# Patient Record
Sex: FEMALE | Race: WHITE | NOT HISPANIC OR LATINO | Employment: FULL TIME | URBAN - METROPOLITAN AREA
[De-identification: names, ages, dates, MRNs, and addresses within clinical notes are randomized per-mention and may not be internally consistent; named-entity substitution may affect disease eponyms.]

---

## 2017-02-15 ENCOUNTER — TRANSCRIBE ORDERS (OUTPATIENT)
Dept: MAMMOGRAPHY | Facility: HOSPITAL | Age: 52
End: 2017-02-15

## 2017-02-15 ENCOUNTER — TRANSCRIBE ORDERS (OUTPATIENT)
Dept: GYNECOLOGIC ONCOLOGY | Facility: CLINIC | Age: 52
End: 2017-02-15

## 2017-02-15 DIAGNOSIS — Z12.31 VISIT FOR SCREENING MAMMOGRAM: Primary | ICD-10-CM

## 2017-03-10 ENCOUNTER — HOSPITAL ENCOUNTER (OUTPATIENT)
Dept: MAMMOGRAPHY | Facility: HOSPITAL | Age: 52
Discharge: HOME | End: 2017-03-10
Admitting: NURSE PRACTITIONER

## 2017-03-10 DIAGNOSIS — Z12.31 VISIT FOR SCREENING MAMMOGRAM: ICD-10-CM

## 2017-03-10 PROCEDURE — G0202 SCR MAMMO BI INCL CAD: HCPCS

## 2017-03-10 PROCEDURE — 77063 BREAST TOMOSYNTHESIS BI: CPT | Performed by: RADIOLOGY

## 2017-03-10 PROCEDURE — 77063 BREAST TOMOSYNTHESIS BI: CPT

## 2017-03-10 PROCEDURE — 77067 SCR MAMMO BI INCL CAD: CPT | Performed by: RADIOLOGY

## 2017-03-29 ENCOUNTER — HOSPITAL ENCOUNTER (OUTPATIENT)
Dept: MAMMOGRAPHY | Facility: HOSPITAL | Age: 52
Discharge: HOME | End: 2017-03-29
Admitting: NURSE PRACTITIONER

## 2017-03-29 DIAGNOSIS — R92.8 ABNORMAL MAMMOGRAM: ICD-10-CM

## 2017-03-29 PROCEDURE — G0206 DX MAMMO INCL CAD UNI: HCPCS

## 2017-03-29 PROCEDURE — 77065 DX MAMMO INCL CAD UNI: CPT | Performed by: RADIOLOGY

## 2017-03-29 PROCEDURE — 77061 BREAST TOMOSYNTHESIS UNI: CPT | Performed by: RADIOLOGY

## 2017-03-29 PROCEDURE — G0279 TOMOSYNTHESIS, MAMMO: HCPCS

## 2018-03-15 ENCOUNTER — TRANSCRIBE ORDERS (OUTPATIENT)
Dept: ADMINISTRATIVE | Facility: HOSPITAL | Age: 53
End: 2018-03-15

## 2018-03-15 DIAGNOSIS — Z12.31 VISIT FOR SCREENING MAMMOGRAM: Primary | ICD-10-CM

## 2019-02-05 ENCOUNTER — HOSPITAL ENCOUNTER (OUTPATIENT)
Dept: MAMMOGRAPHY | Facility: HOSPITAL | Age: 54
Discharge: HOME | End: 2019-02-05
Attending: NURSE PRACTITIONER | Admitting: NURSE PRACTITIONER

## 2019-02-05 DIAGNOSIS — Z12.31 VISIT FOR SCREENING MAMMOGRAM: ICD-10-CM

## 2019-02-05 PROCEDURE — 77067 SCR MAMMO BI INCL CAD: CPT | Performed by: RADIOLOGY

## 2019-02-05 PROCEDURE — 77067 SCR MAMMO BI INCL CAD: CPT

## 2019-02-05 PROCEDURE — 77063 BREAST TOMOSYNTHESIS BI: CPT

## 2019-02-05 PROCEDURE — 77063 BREAST TOMOSYNTHESIS BI: CPT | Performed by: RADIOLOGY

## 2020-02-26 ENCOUNTER — TRANSCRIBE ORDERS (OUTPATIENT)
Dept: ADMINISTRATIVE | Facility: HOSPITAL | Age: 55
End: 2020-02-26

## 2020-02-26 DIAGNOSIS — Z12.31 VISIT FOR SCREENING MAMMOGRAM: Primary | ICD-10-CM

## 2020-02-28 ENCOUNTER — HOSPITAL ENCOUNTER (OUTPATIENT)
Dept: MAMMOGRAPHY | Facility: HOSPITAL | Age: 55
Discharge: HOME | End: 2020-02-28
Admitting: NURSE PRACTITIONER

## 2020-02-28 DIAGNOSIS — Z12.31 VISIT FOR SCREENING MAMMOGRAM: ICD-10-CM

## 2020-02-28 PROCEDURE — 77063 BREAST TOMOSYNTHESIS BI: CPT

## 2020-02-28 PROCEDURE — 77067 SCR MAMMO BI INCL CAD: CPT

## 2020-02-28 PROCEDURE — 77063 BREAST TOMOSYNTHESIS BI: CPT | Performed by: RADIOLOGY

## 2020-02-28 PROCEDURE — 77067 SCR MAMMO BI INCL CAD: CPT | Performed by: RADIOLOGY

## 2021-01-19 ENCOUNTER — TRANSCRIBE ORDERS (OUTPATIENT)
Dept: ADMINISTRATIVE | Facility: HOSPITAL | Age: 56
End: 2021-01-19

## 2021-01-19 DIAGNOSIS — Z12.31 VISIT FOR SCREENING MAMMOGRAM: Primary | ICD-10-CM

## 2021-03-20 ENCOUNTER — HOSPITAL ENCOUNTER (OUTPATIENT)
Dept: MAMMOGRAPHY | Facility: HOSPITAL | Age: 56
Discharge: HOME | End: 2021-03-20
Admitting: NURSE PRACTITIONER

## 2021-03-20 DIAGNOSIS — Z12.31 VISIT FOR SCREENING MAMMOGRAM: ICD-10-CM

## 2021-03-20 PROCEDURE — 77067 SCR MAMMO BI INCL CAD: CPT

## 2021-03-20 PROCEDURE — 77067 SCR MAMMO BI INCL CAD: CPT | Performed by: RADIOLOGY

## 2021-03-20 PROCEDURE — 77063 BREAST TOMOSYNTHESIS BI: CPT

## 2021-03-20 PROCEDURE — 77063 BREAST TOMOSYNTHESIS BI: CPT | Performed by: RADIOLOGY

## 2022-04-19 ENCOUNTER — TRANSCRIBE ORDERS (OUTPATIENT)
Dept: ADMINISTRATIVE | Facility: HOSPITAL | Age: 57
End: 2022-04-19

## 2022-04-19 DIAGNOSIS — Z12.31 VISIT FOR SCREENING MAMMOGRAM: Primary | ICD-10-CM

## 2022-05-05 ENCOUNTER — HOSPITAL ENCOUNTER (OUTPATIENT)
Dept: MAMMOGRAPHY | Facility: HOSPITAL | Age: 57
Discharge: HOME | End: 2022-05-05
Admitting: NURSE PRACTITIONER

## 2022-05-05 DIAGNOSIS — Z12.31 VISIT FOR SCREENING MAMMOGRAM: ICD-10-CM

## 2022-05-05 PROCEDURE — 77063 BREAST TOMOSYNTHESIS BI: CPT | Performed by: RADIOLOGY

## 2022-05-05 PROCEDURE — 77067 SCR MAMMO BI INCL CAD: CPT | Performed by: RADIOLOGY

## 2022-05-05 PROCEDURE — 77063 BREAST TOMOSYNTHESIS BI: CPT

## 2022-05-05 PROCEDURE — 77067 SCR MAMMO BI INCL CAD: CPT

## 2022-12-05 ENCOUNTER — OFFICE VISIT (OUTPATIENT)
Dept: ORTHOPEDIC SURGERY | Facility: CLINIC | Age: 57
End: 2022-12-05

## 2022-12-05 VITALS
HEIGHT: 63 IN | WEIGHT: 140 LBS | BODY MASS INDEX: 24.8 KG/M2 | DIASTOLIC BLOOD PRESSURE: 78 MMHG | SYSTOLIC BLOOD PRESSURE: 132 MMHG

## 2022-12-05 DIAGNOSIS — M79.672 BILATERAL FOOT PAIN: Primary | ICD-10-CM

## 2022-12-05 DIAGNOSIS — M72.2 PLANTAR FASCIITIS: ICD-10-CM

## 2022-12-05 DIAGNOSIS — M79.671 BILATERAL FOOT PAIN: Primary | ICD-10-CM

## 2022-12-05 DIAGNOSIS — I87.8 VENOUS STASIS: ICD-10-CM

## 2022-12-05 PROCEDURE — 99204 OFFICE O/P NEW MOD 45 MIN: CPT | Performed by: ORTHOPAEDIC SURGERY

## 2022-12-05 RX ORDER — ATORVASTATIN CALCIUM 20 MG/1
TABLET, FILM COATED ORAL
COMMUNITY

## 2022-12-05 RX ORDER — CETIRIZINE HYDROCHLORIDE 10 MG/1
CAPSULE, LIQUID FILLED ORAL
COMMUNITY

## 2022-12-05 RX ORDER — SULFAMETHOXAZOLE AND TRIMETHOPRIM 400; 80 MG/1; MG/1
TABLET ORAL
COMMUNITY

## 2022-12-05 RX ORDER — BUPROPION HYDROCHLORIDE 300 MG/1
TABLET ORAL
COMMUNITY
Start: 2022-09-26

## 2022-12-05 NOTE — PROGRESS NOTES
NEW PATIENT    Patient: Julissa Hendricks  : 1965    Primary Care Provider: Dong Bhat MD    Requesting Provider: As above    Pain of the Left Foot and Pain of the Right Foot      History    Chief Complaint: Bilateral foot pain    History of Present Illness: This is an extremely pleasant 57-year-old woman here with several foot problems.  She first started noting the problems about 6 weeks ago.  A little bit earlier in the summer she went on a long road trip type vacation in an  from [place] to the [place] and back to [place].  She does not wear compression socks.  After the trip she began to notice bilateral arch pain.  It feels very stiff and painful in the morning or after being in a seated position.  She also then began to have pain under the right forefoot.  The forefoot pain is worse walking in a shoe.  She does not go barefoot.  She has tried some stretching without much improvement.  She rates the pain in the 3 different areas as 3 out of 10.  She wants to make sure that she is not causing any damage.  She has been looking up problems on the Internet, and thinks she might have Planter fasciitis metatarsalgia      The patient does not have a personal or family history of DVT, PE, hypercoagulable state or risk factors for clotting.        Current Outpatient Medications on File Prior to Visit   Medication Sig Dispense Refill   • atorvastatin (LIPITOR) 20 MG tablet      • buPROPion XL (WELLBUTRIN XL) 300 MG 24 hr tablet      • Cetirizine HCl (ZyrTEC Allergy) 10 MG capsule      • sulfamethoxazole-trimethoprim (Bactrim) 400-80 MG tablet Bactrim       No current facility-administered medications on file prior to visit.      Allergies   Allergen Reactions   • Neosporin + Pain Relief Max St [Qymwh-Bmync-Yvkfreu-Pramoxine] Unknown - Low Severity   • Adhesive Tape Rash      History reviewed. No pertinent past medical history.  History reviewed. No pertinent surgical  "history.  Family History   Problem Relation Age of Onset   • Breast cancer Neg Hx    • Ovarian cancer Neg Hx       Social History     Socioeconomic History   • Marital status:         Review of Systems    The following portions of the patient's history were reviewed and updated as appropriate: allergies, current medications, past family history, past medical history, past social history, past surgical history and problem list.    Physical Exam:   /78   Ht 160 cm (62.99\")   Wt 63.5 kg (140 lb)   BMI 24.81 kg/m²   GENERAL: Body habitus: normal weight for height    Lower extremity edema: Right: trace; Left: trace    Varicose veins:  Right: mild; Left: mild    Gait: antalgic with the first few steps     Mental Status:  awake and alert; oriented to person, place, and time    Voice:  clear  SKIN:  Lower extremity: Normal    Hair Growth(lower extremity):  Right:normal; Left:  normal  NAILS: Toenails: normal  HEENT: Head: Normocephalic, atraumatic,  without obvious abnormality.  eye: normal external eye, no icterus  ears:normal external ears  PULM:  Repiratory effort normal  CV:  Dorsalis Pedis:  Right: 2+; Left:2+    Posterior Tibial: Right:2+; Left:2+    Capillary Refill:  Brisk  MSK:  Hand:sensation intact      Tibia:  Right:  non tender; Left:  non tender      Ankle:  Right: non tender; Left:  non tender      Foot:  Right:  Mildly tender along the plantar fascia and tender under the second metatarsal head, no swelling, normal range of motion; Left:  Mildly tender along the plantar fascial      NEURO: Heel Walking:  Right:  normal; Left:  normal    Toe Walking:  Right:  normal; Left:  normal     Ecorse-Nohemy 5.07 monofilament test: normal    Lower extremity sensation: intact        Calf Atrophy:none    Motor Function: all 5/5         Medical Decision Making    Data Review:   ordered and reviewed x-rays today    Assessment and Plan/ Diagnosis/Treatment options:   1. Bilateral foot pain  She has 3 " "different sources for foot pain.  The first is metatarsalgia on the right.  I showed her how to tape the toe or to use a Budin splint and I also gave her a metatarsal pad and explained how to put it in her shoe.  I also gave her prescription for custom orthotics.  I explained metatarsalgia.  I do not think she is developing hammertoe I think it is purely metatarsalgia.  - XR Foot 2 View Bilateral      I will be happy to see her anytime      2. Plantar fasciitis  Her second problem is Planter fasciitis.  Diagnosis: plantar fasciitis:  I explained plantar fasciitis in detail to the patient.  I explained to the bow-string mechanism of the plantar fascia.  I went over the current research of the [employer] with them.  I explained the treatments that were not statistically significant in improving the problem including: injection of steroids, special orthotics, special shoes, surgery, medication, ice, not working, etc.    I explained the treatments that are statistically significant at improving the problem.  These include stretching/night splinting, casting, PRP.    I explained the most important treatment: Stretching and night splinting.  I went over the patient information sheet with them, I showed them the stretches and they were able to reproduce them.  I emphasized the \"toe pull\" as the most important stretch.  They need to do the stretches 5 repetitions each, 6-8 times per day.  I explained how to do them at work, at home, before getting out of bed, before getting out of the car, and before getting up from a chair.    We provided them with a night splint.    If they do not improve after 4 months of aggressive stretching and night splinting, the next step will be a short leg fiberglass walking cast worn for 6 weeks.    · Preprinted education was provided to the patient.        .      3. Venous stasis  The third problem I think is some mild edema and venous stasis.  I explained that " even mild venous stasis can increase the stiffness that we feel an aching leg pain.  I explained edema and venous stasis to the patient, and the often hereditary nature of the problem, and the contribution of weight, trauma, age, etc. I explained how these factors cause edema (due to gravity, etc) I explained how the edema can lead to ulceration.  I explained how the edema can cause pain, stiffness, aching, cramping, etc. I explained that it is a very very common problem, so common that even [employer] markets compression stockings.  I explained that diuretics cannot correct the problem. I recommend wearing support stockings (compression stockings) daily, we discussed what type and where to find them                  Part of this encounter note is an electronic transcription/translation of spoken language to printed text. The electronic translation of spoken language may permit erroneous, or at times, nonsensical words or phrases to be inadvertently transcribed; Although I have reviewed the note for such errors, some may still exist.          Rachel Quan MD

## 2023-05-02 ENCOUNTER — TRANSCRIBE ORDERS (OUTPATIENT)
Dept: ADMINISTRATIVE | Facility: HOSPITAL | Age: 58
End: 2023-05-02
Payer: COMMERCIAL

## 2023-05-02 DIAGNOSIS — Z12.31 VISIT FOR SCREENING MAMMOGRAM: Primary | ICD-10-CM

## 2023-05-23 ENCOUNTER — HOSPITAL ENCOUNTER (OUTPATIENT)
Dept: MAMMOGRAPHY | Facility: HOSPITAL | Age: 58
Discharge: HOME | End: 2023-05-23
Admitting: FAMILY MEDICINE
Payer: COMMERCIAL

## 2023-05-23 DIAGNOSIS — Z12.31 VISIT FOR SCREENING MAMMOGRAM: ICD-10-CM

## 2023-05-23 PROCEDURE — 77063 BREAST TOMOSYNTHESIS BI: CPT

## 2023-05-23 PROCEDURE — 77067 SCR MAMMO BI INCL CAD: CPT

## 2024-05-07 ENCOUNTER — TRANSCRIBE ORDERS (OUTPATIENT)
Dept: ADMINISTRATIVE | Facility: HOSPITAL | Age: 59
End: 2024-05-07
Payer: COMMERCIAL

## 2024-05-07 DIAGNOSIS — Z12.31 VISIT FOR SCREENING MAMMOGRAM: Primary | ICD-10-CM

## 2024-05-30 ENCOUNTER — HOSPITAL ENCOUNTER (OUTPATIENT)
Dept: MAMMOGRAPHY | Facility: HOSPITAL | Age: 59
Discharge: HOME | End: 2024-05-30
Admitting: FAMILY MEDICINE
Payer: COMMERCIAL

## 2024-05-30 DIAGNOSIS — Z12.31 VISIT FOR SCREENING MAMMOGRAM: ICD-10-CM

## 2024-05-30 PROCEDURE — 77067 SCR MAMMO BI INCL CAD: CPT

## 2024-05-30 PROCEDURE — 77063 BREAST TOMOSYNTHESIS BI: CPT

## 2025-04-18 ENCOUNTER — TRANSCRIBE ORDERS (OUTPATIENT)
Dept: ADMINISTRATIVE | Facility: HOSPITAL | Age: 60
End: 2025-04-18
Payer: COMMERCIAL

## 2025-04-18 DIAGNOSIS — Z12.31 SCREENING MAMMOGRAM FOR BREAST CANCER: Primary | ICD-10-CM

## 2025-05-27 ENCOUNTER — TRANSCRIBE ORDERS (OUTPATIENT)
Dept: LAB | Facility: HOSPITAL | Age: 60
End: 2025-05-27
Payer: COMMERCIAL

## 2025-05-27 ENCOUNTER — LAB (OUTPATIENT)
Dept: LAB | Facility: HOSPITAL | Age: 60
End: 2025-05-27
Payer: COMMERCIAL

## 2025-05-27 ENCOUNTER — TRANSCRIBE ORDERS (OUTPATIENT)
Dept: ADMINISTRATIVE | Facility: HOSPITAL | Age: 60
End: 2025-05-27
Payer: COMMERCIAL

## 2025-05-27 DIAGNOSIS — Z01.419 ROUTINE GYNECOLOGICAL EXAMINATION: Primary | ICD-10-CM

## 2025-05-27 DIAGNOSIS — Z01.419 ROUTINE GYNECOLOGICAL EXAMINATION: ICD-10-CM

## 2025-05-27 DIAGNOSIS — Z78.0 POSTMENOPAUSE: Primary | ICD-10-CM

## 2025-05-27 LAB
ESTRADIOL SERPL HS-MCNC: <5 PG/ML
FSH SERPL-ACNC: 47.1 MIU/ML

## 2025-05-27 PROCEDURE — 84403 ASSAY OF TOTAL TESTOSTERONE: CPT

## 2025-05-27 PROCEDURE — 82670 ASSAY OF TOTAL ESTRADIOL: CPT

## 2025-05-27 PROCEDURE — 36415 COLL VENOUS BLD VENIPUNCTURE: CPT

## 2025-05-27 PROCEDURE — 83001 ASSAY OF GONADOTROPIN (FSH): CPT

## 2025-05-27 PROCEDURE — 84402 ASSAY OF FREE TESTOSTERONE: CPT

## 2025-05-27 PROCEDURE — 83498 ASY HYDROXYPROGESTERONE 17-D: CPT

## 2025-05-29 LAB
TESTOST FREE SERPL-MCNC: 0.5 PG/ML (ref 0–4.2)
TESTOST SERPL-MCNC: 3 NG/DL (ref 4–50)

## 2025-05-31 LAB — 17OHP SERPL-MCNC: 16 NG/DL

## 2025-06-01 LAB
NCCN CRITERIA FLAG: NORMAL
TYRER CUZICK SCORE: 6.6

## 2025-06-02 ENCOUNTER — HOSPITAL ENCOUNTER (OUTPATIENT)
Dept: MAMMOGRAPHY | Facility: HOSPITAL | Age: 60
Discharge: HOME | End: 2025-06-02
Admitting: FAMILY MEDICINE
Payer: COMMERCIAL

## 2025-06-02 DIAGNOSIS — Z12.31 SCREENING MAMMOGRAM FOR BREAST CANCER: ICD-10-CM

## 2025-06-02 PROCEDURE — 77063 BREAST TOMOSYNTHESIS BI: CPT

## 2025-06-02 PROCEDURE — 77063 BREAST TOMOSYNTHESIS BI: CPT | Performed by: RADIOLOGY

## 2025-06-02 PROCEDURE — 77067 SCR MAMMO BI INCL CAD: CPT | Performed by: RADIOLOGY

## 2025-06-02 PROCEDURE — 77067 SCR MAMMO BI INCL CAD: CPT

## 2025-06-05 ENCOUNTER — HOSPITAL ENCOUNTER (OUTPATIENT)
Dept: BONE DENSITY | Facility: HOSPITAL | Age: 60
Discharge: HOME | End: 2025-06-05
Admitting: NURSE PRACTITIONER
Payer: COMMERCIAL

## 2025-06-05 DIAGNOSIS — Z78.0 POSTMENOPAUSE: ICD-10-CM

## 2025-06-05 PROCEDURE — 77080 DXA BONE DENSITY AXIAL: CPT

## 2025-06-10 ENCOUNTER — HOSPITAL ENCOUNTER (OUTPATIENT)
Dept: MAMMOGRAPHY | Facility: HOSPITAL | Age: 60
Discharge: HOME | End: 2025-06-10
Admitting: RADIOLOGY
Payer: COMMERCIAL

## 2025-06-10 DIAGNOSIS — R92.8 ABNORMAL MAMMOGRAM: ICD-10-CM

## 2025-06-10 PROCEDURE — 77065 DX MAMMO INCL CAD UNI: CPT | Performed by: RADIOLOGY

## 2025-06-10 PROCEDURE — 77065 DX MAMMO INCL CAD UNI: CPT

## 2025-06-10 PROCEDURE — 77061 BREAST TOMOSYNTHESIS UNI: CPT | Performed by: RADIOLOGY

## 2025-06-10 PROCEDURE — G0279 TOMOSYNTHESIS, MAMMO: HCPCS
